# Patient Record
Sex: MALE | Race: OTHER | HISPANIC OR LATINO | Employment: FULL TIME | URBAN - METROPOLITAN AREA
[De-identification: names, ages, dates, MRNs, and addresses within clinical notes are randomized per-mention and may not be internally consistent; named-entity substitution may affect disease eponyms.]

---

## 2024-07-20 ENCOUNTER — OFFICE VISIT (OUTPATIENT)
Dept: URGENT CARE | Facility: CLINIC | Age: 59
End: 2024-07-20
Payer: COMMERCIAL

## 2024-07-20 VITALS
RESPIRATION RATE: 20 BRPM | TEMPERATURE: 98 F | SYSTOLIC BLOOD PRESSURE: 149 MMHG | WEIGHT: 161.19 LBS | HEART RATE: 86 BPM | DIASTOLIC BLOOD PRESSURE: 89 MMHG | OXYGEN SATURATION: 95 %

## 2024-07-20 DIAGNOSIS — H57.89 IRRITATION OF BOTH EYES: Primary | ICD-10-CM

## 2024-07-20 PROCEDURE — 99203 OFFICE O/P NEW LOW 30 MIN: CPT | Mod: S$GLB,,, | Performed by: FAMILY MEDICINE

## 2024-07-20 NOTE — PROGRESS NOTES
Subjective:      Patient ID: Donald Foreman is a 58 y.o. male.    Vitals:  weight is 73.1 kg (161 lb 2.5 oz). His oral temperature is 98.4 °F (36.9 °C). His blood pressure is 149/89 (abnormal) and his pulse is 86. His respiration is 20 and oxygen saturation is 95%.     Chief Complaint: Eye Problem    Pt presents today with putting ear wax drops into eyes, did call poison control and was advised to shower for 15 mins with running water on face for 15mins blinking to wash it  out.   Feels slightly irritated , no FB sensation      Eye Problem   Both eyes are affected. This is a new problem. The current episode started today. The problem occurs constantly. The problem has been gradually worsening. There was no injury mechanism. The pain is at a severity of 0/10. The patient is experiencing no pain. There is No known exposure to pink eye. He Does not wear contacts. Associated symptoms include blurred vision and eye redness. He has tried water for the symptoms. The treatment provided no relief.       Eyes:  Positive for eye redness and blurred vision.      Objective:     Physical Exam   Constitutional: He is oriented to person, place, and time. He appears well-developed. He is cooperative.  Non-toxic appearance. He does not appear ill. No distress.   HENT:   Head: Normocephalic and atraumatic.   Ears:   Right Ear: Hearing, tympanic membrane, external ear and ear canal normal.   Left Ear: Hearing, tympanic membrane, external ear and ear canal normal.      Comments: Ear canals normal  No wax seen    Nose: Nose normal. No mucosal edema, rhinorrhea or nasal deformity. No epistaxis. Right sinus exhibits no maxillary sinus tenderness and no frontal sinus tenderness. Left sinus exhibits no maxillary sinus tenderness and no frontal sinus tenderness.   Mouth/Throat: Uvula is midline, oropharynx is clear and moist and mucous membranes are normal. No trismus in the jaw. Normal dentition. No uvula swelling. No posterior oropharyngeal  erythema.   Eyes: Conjunctivae and lids are normal. Pupils are equal, round, and reactive to light. Right eye exhibits no discharge. Left eye exhibits no discharge. No scleral icterus. Extraocular movement intact      Comments: Both conjunctivae with slight erythema, no FB seen,  Tetracine instilled florescein stained, no abrasion seen, no FB  Irrigated using saline     Neck: Trachea normal and phonation normal. Neck supple.   Cardiovascular: Normal rate, regular rhythm, normal heart sounds and normal pulses.   Pulmonary/Chest: Effort normal and breath sounds normal. No respiratory distress.   Abdominal: Normal appearance and bowel sounds are normal. He exhibits no distension and no mass. Soft. There is no abdominal tenderness.   Musculoskeletal: Normal range of motion.         General: No deformity. Normal range of motion.   Neurological: He is alert and oriented to person, place, and time. He exhibits normal muscle tone. Coordination normal.   Skin: Skin is warm, dry, intact, not diaphoretic and not pale.   Psychiatric: His speech is normal and behavior is normal. Judgment and thought content normal.   Nursing note and vitals reviewed.      Assessment:     1. Irritation of both eyes        Plan:       Irritation of both eyes      Reassurance    Irrigation both eyes with copious saline

## 2024-10-25 ENCOUNTER — HOSPITAL ENCOUNTER (EMERGENCY)
Facility: HOSPITAL | Age: 59
Discharge: HOME | End: 2024-10-26
Attending: EMERGENCY MEDICINE
Payer: COMMERCIAL

## 2024-10-25 DIAGNOSIS — S92.512A CLOSED DISPLACED FRACTURE OF PROXIMAL PHALANX OF LESSER TOE OF LEFT FOOT, INITIAL ENCOUNTER: Primary | ICD-10-CM

## 2024-10-25 PROCEDURE — 99285 EMERGENCY DEPT VISIT HI MDM: CPT | Mod: 25

## 2024-10-25 PROCEDURE — 63600175 PHARM REV CODE 636 W HCPCS: Performed by: EMERGENCY MEDICINE

## 2024-10-25 RX ORDER — IBUPROFEN 800 MG/1
800 TABLET ORAL EVERY 6 HOURS PRN
Qty: 15 TABLET | Refills: 0 | Status: SHIPPED | OUTPATIENT
Start: 2024-10-25

## 2024-10-25 RX ORDER — LIDOCAINE HYDROCHLORIDE 10 MG/ML
5 INJECTION, SOLUTION EPIDURAL; INFILTRATION; INTRACAUDAL; PERINEURAL
Status: COMPLETED | OUTPATIENT
Start: 2024-10-25 | End: 2024-10-25

## 2024-10-25 RX ADMIN — LIDOCAINE HYDROCHLORIDE 50 MG: 10 INJECTION, SOLUTION EPIDURAL; INFILTRATION; INTRACAUDAL; PERINEURAL at 11:10

## 2024-10-26 VITALS
RESPIRATION RATE: 16 BRPM | HEART RATE: 71 BPM | WEIGHT: 161 LBS | SYSTOLIC BLOOD PRESSURE: 161 MMHG | BODY MASS INDEX: 31.61 KG/M2 | TEMPERATURE: 98 F | DIASTOLIC BLOOD PRESSURE: 89 MMHG | HEIGHT: 60 IN | OXYGEN SATURATION: 97 %